# Patient Record
Sex: FEMALE | Race: WHITE | NOT HISPANIC OR LATINO | Employment: UNEMPLOYED | RURAL
[De-identification: names, ages, dates, MRNs, and addresses within clinical notes are randomized per-mention and may not be internally consistent; named-entity substitution may affect disease eponyms.]

---

## 2021-08-18 ENCOUNTER — HOSPITAL ENCOUNTER (EMERGENCY)
Facility: HOSPITAL | Age: 26
Discharge: HOME | End: 2021-08-18
Payer: OTHER GOVERNMENT

## 2021-08-18 VITALS
BODY MASS INDEX: 37.36 KG/M2 | OXYGEN SATURATION: 100 % | RESPIRATION RATE: 16 BRPM | TEMPERATURE: 98 F | WEIGHT: 203 LBS | HEIGHT: 62 IN | SYSTOLIC BLOOD PRESSURE: 146 MMHG | HEART RATE: 62 BPM | DIASTOLIC BLOOD PRESSURE: 94 MMHG

## 2021-08-18 DIAGNOSIS — B34.9 VIRAL SYNDROME: Primary | ICD-10-CM

## 2021-08-18 PROCEDURE — 99282 PR EMERGENCY DEPT VISIT,LEVEL II: ICD-10-PCS | Mod: ,,, | Performed by: NURSE PRACTITIONER

## 2021-08-18 PROCEDURE — 99281 EMR DPT VST MAYX REQ PHY/QHP: CPT

## 2021-08-18 PROCEDURE — 99282 EMERGENCY DEPT VISIT SF MDM: CPT | Mod: ,,, | Performed by: NURSE PRACTITIONER

## 2022-01-30 ENCOUNTER — OFFICE VISIT (OUTPATIENT)
Dept: FAMILY MEDICINE | Facility: CLINIC | Age: 27
End: 2022-01-30
Payer: OTHER GOVERNMENT

## 2022-01-30 VITALS
SYSTOLIC BLOOD PRESSURE: 120 MMHG | TEMPERATURE: 98 F | WEIGHT: 200 LBS | HEART RATE: 106 BPM | OXYGEN SATURATION: 98 % | DIASTOLIC BLOOD PRESSURE: 81 MMHG | BODY MASS INDEX: 36.8 KG/M2 | HEIGHT: 62 IN

## 2022-01-30 DIAGNOSIS — Z11.52 ENCOUNTER FOR SCREENING FOR COVID-19: ICD-10-CM

## 2022-01-30 DIAGNOSIS — U07.1 CLINICAL DIAGNOSIS OF COVID-19: Primary | ICD-10-CM

## 2022-01-30 LAB
CTP QC/QA: YES
SARS-COV-2 AG RESP QL IA.RAPID: POSITIVE

## 2022-01-30 PROCEDURE — 87426 SARS CORONAVIRUS 2 ANTIGEN POCT: ICD-10-PCS | Mod: QW,,, | Performed by: NURSE PRACTITIONER

## 2022-01-30 PROCEDURE — 99213 OFFICE O/P EST LOW 20 MIN: CPT | Mod: ,,, | Performed by: NURSE PRACTITIONER

## 2022-01-30 PROCEDURE — 87426 SARSCOV CORONAVIRUS AG IA: CPT | Mod: QW,,, | Performed by: NURSE PRACTITIONER

## 2022-01-30 PROCEDURE — 99213 PR OFFICE/OUTPT VISIT, EST, LEVL III, 20-29 MIN: ICD-10-PCS | Mod: ,,, | Performed by: NURSE PRACTITIONER

## 2022-01-30 RX ORDER — LORATADINE 10 MG/1
10 TABLET ORAL DAILY
Qty: 30 TABLET | Refills: 0 | Status: SHIPPED | OUTPATIENT
Start: 2022-01-30 | End: 2023-10-02

## 2022-01-30 NOTE — PROGRESS NOTES
GORDON Farnsworth   [place] ROZINA RODRIGUEZ [address] [address]  [phone]      PATIENT NAME: Piper Antoine  : 1995  DATE: 22  MRN: [id number]      Billing Provider: GORDON Farnsworth  Level of Service: MT OFFICE/OUTPT VISIT, EST, LEVL III, 20-29 MIN  Patient PCP Information     Provider PCP Type    Primary Doctor No General          Reason for Visit / Chief Complaint: Fever, Cough (X2 days/), Nasal Congestion, Headache, and Sore Throat (X1 week)       Update PCP  Update Chief Complaint         History of Present Illness / Problem Focused Workflow     Patient presents to clinic with the above listed complaints. She is COVID positive today. States her children has COVID and Flu. She is currently taking an unknown antibiotic for a vaginal abscess. States she has not taken any over the counter medications for symptoms. Encouraged cough and deep breathing to prevent pneumonia.     Review of Systems     Review of Systems   Constitutional: Positive for fever. Negative for chills, diaphoresis and fatigue.   HENT: Positive for congestion and sore throat. Negative for ear pain, facial swelling and trouble swallowing.    Eyes: Negative for pain, discharge, redness, itching and visual disturbance.   Respiratory: Positive for cough. Negative for apnea, chest tightness, shortness of breath and wheezing.    Cardiovascular: Negative for chest pain, palpitations and leg swelling.   Gastrointestinal: Negative for abdominal pain, constipation, diarrhea, nausea and vomiting.   Genitourinary: Negative for dysuria.   Skin: Negative for rash.   Neurological: Positive for headaches. Negative for dizziness.       Medical / Social / Family History   History reviewed. No pertinent past medical history.    History reviewed. No pertinent surgical history.    Social History  Ms.  reports that she has never smoked. She has never used  smokeless tobacco. She reports that she does not drink alcohol and does not use drugs.    Family History  Ms.'s family history is not on file.    Medications and Allergies     Medications  No outpatient medications have been marked as taking for the 1/30/22 encounter (Office Visit) with GORDON Farnsworth.       Allergies  Review of patient's allergies indicates:  No Known Allergies    Physical Examination     Vitals:    01/30/22 1059   BP: 120/81   Pulse: 106   Temp: 98.2 °F (36.8 °C)     Physical Exam  Constitutional:       General: She is not in acute distress.     Appearance: She is ill-appearing.   HENT:      Right Ear: External ear normal.      Left Ear: External ear normal.      Nose: Rhinorrhea present. No congestion.   Eyes:      Extraocular Movements: Extraocular movements intact.      Conjunctiva/sclera: Conjunctivae normal.      Pupils: Pupils are equal, round, and reactive to light.   Cardiovascular:      Rate and Rhythm: Normal rate and regular rhythm.      Pulses: Normal pulses.      Heart sounds: Normal heart sounds.   Pulmonary:      Effort: Pulmonary effort is normal. No respiratory distress.      Breath sounds: Normal breath sounds. No wheezing, rhonchi or rales.   Chest:      Chest wall: Tenderness present.   Neurological:      Mental Status: She is alert.         Assessment and Plan (including Health Maintenance)      Problem List  Smart Sets  Document Outside HM   :    Health Maintenance Due   Topic Date Due    Hepatitis C Screening  Never done    Lipid Panel  Never done    COVID-19 Vaccine (1) Never done    HPV Vaccines (1 - 2-dose series) Never done    HIV Screening  Never done    TETANUS VACCINE  Never done    Pap Smear  Never done    Influenza Vaccine (1) Never done       Problem List Items Addressed This Visit    None     Visit Diagnoses     Encounter for screening for COVID-19    -  Primary    Relevant Orders    SARS Coronavirus 2 Antigen, POCT (Completed)    Clinical diagnosis  of COVID-19              The patient has no Health Maintenance topics of status Not Due    No future appointments.       Signature:  GORDON Farnsworth [address] [place] [address]  [phone]    Date of encounter: 1/30/22

## 2022-01-30 NOTE — LETTER
[address] [place] [address]  Phone: [phone]  Fax: [phone]          Return to Work/School    Patient: Piper Antoine  YOB: 1995   Date: 01/30/2022     To Whom It May Concern:     Piper Antoine was in contact with/seen in my office on 01/30/2022.     Piper Antoine has met the criteria for COVID-19 testing and has a POSITIVE result. She can return to work once they are asymptomatic for 24 hours without the use of fever reducing medications AND at least five days from the start of symptoms (or from the first positive result if they have no symptoms). She may return to work on 02/04/2022.     If you have any questions or concerns, or if I can be of further assistance, please do not hesitate to contact me.     Sincerely,    GORDON Farnsworth

## 2022-03-03 ENCOUNTER — HOSPITAL ENCOUNTER (EMERGENCY)
Facility: HOSPITAL | Age: 27
Discharge: HOME | End: 2022-03-03
Payer: OTHER GOVERNMENT

## 2022-03-03 VITALS
RESPIRATION RATE: 16 BRPM | HEART RATE: 70 BPM | WEIGHT: 211 LBS | OXYGEN SATURATION: 100 % | TEMPERATURE: 98 F | DIASTOLIC BLOOD PRESSURE: 68 MMHG | BODY MASS INDEX: 38.83 KG/M2 | SYSTOLIC BLOOD PRESSURE: 132 MMHG | HEIGHT: 62 IN

## 2022-03-03 DIAGNOSIS — M54.50 LOW BACK PAIN, UNSPECIFIED BACK PAIN LATERALITY, UNSPECIFIED CHRONICITY, UNSPECIFIED WHETHER SCIATICA PRESENT: Primary | ICD-10-CM

## 2022-03-03 LAB
B-HCG UR QL: NEGATIVE
BACTERIA #/AREA URNS HPF: ABNORMAL /HPF
BILIRUB UR QL STRIP: ABNORMAL
CLARITY UR: ABNORMAL
COLOR UR: ABNORMAL
CTP QC/QA: YES
GLUCOSE UR STRIP-MCNC: NEGATIVE MG/DL
KETONES UR STRIP-SCNC: NEGATIVE MG/DL
LEUKOCYTE ESTERASE UR QL STRIP: ABNORMAL
MUCOUS THREADS #/AREA URNS HPF: ABNORMAL /HPF
NITRITE UR QL STRIP: NEGATIVE
PH UR STRIP: 7.5 PH UNITS
PROT UR QL STRIP: 30
RBC # UR STRIP: ABNORMAL /UL
RBC #/AREA URNS HPF: ABNORMAL /HPF
SP GR UR STRIP: 1.01
SQUAMOUS #/AREA URNS LPF: ABNORMAL /LPF
UROBILINOGEN UR STRIP-ACNC: 0.2 MG/DL
WBC #/AREA URNS HPF: ABNORMAL /HPF

## 2022-03-03 PROCEDURE — 81025 URINE PREGNANCY TEST: CPT | Performed by: NURSE PRACTITIONER

## 2022-03-03 PROCEDURE — 99283 EMERGENCY DEPT VISIT LOW MDM: CPT | Mod: ,,, | Performed by: NURSE PRACTITIONER

## 2022-03-03 PROCEDURE — 99283 PR EMERGENCY DEPT VISIT,LEVEL III: ICD-10-PCS | Mod: ,,, | Performed by: NURSE PRACTITIONER

## 2022-03-03 PROCEDURE — 81001 URINALYSIS AUTO W/SCOPE: CPT | Performed by: NURSE PRACTITIONER

## 2022-03-03 PROCEDURE — 99284 EMERGENCY DEPT VISIT MOD MDM: CPT | Mod: 25

## 2022-03-03 RX ORDER — METHOCARBAMOL 500 MG/1
1000 TABLET, FILM COATED ORAL 3 TIMES DAILY
Qty: 30 TABLET | Refills: 0 | Status: SHIPPED | OUTPATIENT
Start: 2022-03-03 | End: 2022-03-08

## 2022-03-03 RX ORDER — IBUPROFEN 800 MG/1
800 TABLET ORAL EVERY 6 HOURS PRN
Qty: 20 TABLET | Refills: 0 | Status: SHIPPED | OUTPATIENT
Start: 2022-03-03 | End: 2022-08-23

## 2022-03-03 RX ORDER — KETOROLAC TROMETHAMINE 30 MG/ML
60 INJECTION, SOLUTION INTRAMUSCULAR; INTRAVENOUS
Status: DISCONTINUED | OUTPATIENT
Start: 2022-03-03 | End: 2022-03-03

## 2022-03-03 NOTE — ED PROVIDER NOTES
Encounter Date: 3/3/2022       History     Chief Complaint   Patient presents with    Flank Pain     Right     26 year old female presents to the emergency department to be evaluated for right flank pain that began two days ago. Her pain increases when she bends or twists. She has had some mild nausea. Denies any vomiting, abdominal pain, constipation, diarrhea, dysuria, vaginal discharge, fever, chills.    The history is provided by the patient.   Flank Pain  This is a new problem. The current episode started 2 days ago. Pertinent negatives include no chest pain, no abdominal pain, no headaches and no shortness of breath.     Review of patient's allergies indicates:  No Known Allergies  History reviewed. No pertinent past medical history.  History reviewed. No pertinent surgical history.  History reviewed. No pertinent family history.  Social History     Tobacco Use    Smoking status: Never Smoker    Smokeless tobacco: Never Used   Substance Use Topics    Alcohol use: Never    Drug use: Never     Review of Systems   Respiratory: Negative for shortness of breath.    Cardiovascular: Negative for chest pain.   Gastrointestinal: Positive for nausea. Negative for abdominal distention, abdominal pain, constipation, diarrhea and vomiting.   Genitourinary: Positive for flank pain.   Neurological: Negative for headaches.   All other systems reviewed and are negative.      Physical Exam     Initial Vitals [03/03/22 0915]   BP Pulse Resp Temp SpO2   128/65 73 18 98.4 °F (36.9 °C) 100 %      MAP       --         Physical Exam    Vitals reviewed.  Constitutional: She appears well-developed and well-nourished.   Neck: Neck supple.   Cardiovascular: Normal rate and regular rhythm.   Pulmonary/Chest: Breath sounds normal.   Abdominal: Abdomen is soft. Bowel sounds are normal. She exhibits no distension and no mass. There is no abdominal tenderness. There is no rebound and no guarding.   Musculoskeletal:         General:  Normal range of motion.      Cervical back: Normal and neck supple.      Thoracic back: Normal.      Lumbar back: Normal.     Neurological: She is alert and oriented to person, place, and time. She has normal strength. GCS score is 15. GCS eye subscore is 4. GCS verbal subscore is 5. GCS motor subscore is 6.   Skin: Skin is warm and dry. Capillary refill takes less than 2 seconds.   Psychiatric: She has a normal mood and affect.         Medical Screening Exam   See Full Note    ED Course   Procedures  Labs Reviewed   URINALYSIS - Abnormal; Notable for the following components:       Result Value    Color, UA Red (*)     Clarity, UA Cloudy (*)     Leukocytes, UA Small (*)     Protein, UA 30  (*)     Bilirubin, UA Small (*)     Blood, UA Large (*)     All other components within normal limits   URINALYSIS, MICROSCOPIC - Abnormal; Notable for the following components:    RBC, UA Too Numerous To Count (*)     Bacteria, UA Few (*)     Squamous Epithelial Cells, UA Few (*)     Mucus, UA Rare (*)     All other components within normal limits   POCT URINE PREGNANCY          Imaging Results          CT Renal Stone Study Abd Pelvis WO (Final result)  Result time 03/03/22 10:27:03    Final result by David Diamond DO (03/03/22 10:27:03)                 Impression:      1. No evidence to suggest acute pathology within the abdomen or pelvis.      Electronically signed by: David Diamond  Date:    03/03/2022  Time:    10:27             Narrative:    EXAMINATION:  CT RENAL STONE STUDY ABD PELVIS WO    CLINICAL HISTORY:  right flank pain;    COMPARISON:  None.    TECHNIQUE:  CT RENAL STONE STUDY ABD PELVIS WO    FINDINGS:  Lower lobes: Clear.    Cardiac: No effusion.    Abdomen:    Hepatobiliary/gallbladder: Normal for noncontrast technique.  Gallbladder is normal.  No ductal obstruction.    Spleen: Normal for noncontrast technique.    Pancreas: Normal for noncontrast technique.    Adrenal/Genitourinary system: Normal for  noncontrast technique.    Bowel and Mesentery: There is no evidence for bowel obstruction.  Normal appendix.    Peritoneum: Normal.    Retroperitoneum: No enlarged lymph nodes.    Vasculature: Normal.    Reproductive: Normal.    Lymph nodes: No enlarged lymph nodes.    Abdominal wall: Normal.    Osseous structures: Normal.                                 Medications - No data to display                    Clinical Impression:   Final diagnoses:  [M54.50] Low back pain, unspecified back pain laterality, unspecified chronicity, unspecified whether sciatica present (Primary)          ED Disposition Condition    Discharge Stable        ED Prescriptions     Medication Sig Dispense Start Date End Date Auth. Provider    ibuprofen (ADVIL,MOTRIN) 800 MG tablet Take 1 tablet (800 mg total) by mouth every 6 (six) hours as needed for Pain. 20 tablet 3/3/2022  GORDON Bhatti    methocarbamoL (ROBAXIN) 500 MG Tab Take 2 tablets (1,000 mg total) by mouth 3 (three) times daily. for 5 days 30 tablet 3/3/2022 3/8/2022 GORDON Bhatti        Follow-up Information    None          GORDON Bhatti  03/03/22 1058       GORDON Bhatti  03/03/22 1059       GORDON Bhatti  03/03/22 1100

## 2022-06-28 ENCOUNTER — HOSPITAL ENCOUNTER (EMERGENCY)
Facility: HOSPITAL | Age: 27
Discharge: HOME | End: 2022-06-28
Payer: OTHER GOVERNMENT

## 2022-06-28 VITALS
SYSTOLIC BLOOD PRESSURE: 115 MMHG | RESPIRATION RATE: 17 BRPM | HEIGHT: 62 IN | TEMPERATURE: 99 F | OXYGEN SATURATION: 97 % | HEART RATE: 87 BPM | DIASTOLIC BLOOD PRESSURE: 77 MMHG | WEIGHT: 211 LBS | BODY MASS INDEX: 38.83 KG/M2
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
## 2022-06-28 DIAGNOSIS — S60.562A INSECT BITE OF LEFT HAND, INITIAL ENCOUNTER: Primary | ICD-10-CM

## 2022-06-28 PROCEDURE — 99282 EMERGENCY DEPT VISIT SF MDM: CPT

## 2022-06-28 PROCEDURE — 99283 EMERGENCY DEPT VISIT LOW MDM: CPT | Mod: ,,, | Performed by: NURSE PRACTITIONER

## 2022-06-28 PROCEDURE — 99283 PR EMERGENCY DEPT VISIT,LEVEL III: ICD-10-PCS | Mod: ,,, | Performed by: NURSE PRACTITIONER

## 2022-06-28 RX ORDER — TRIAMCINOLONE ACETONIDE 0.25 MG/G
CREAM TOPICAL 2 TIMES DAILY
Qty: 15 G | Refills: 0 | Status: SHIPPED | OUTPATIENT
Start: 2022-06-28 | End: 2022-08-23

## 2022-06-28 NOTE — ED TRIAGE NOTES
Pt reports being bitten by a small black insect that looked like a spider on the outside of her left hand. Denies fever or swelling to the area

## 2022-06-29 NOTE — DISCHARGE INSTRUCTIONS
Use ointment as directed to affected area.   Follow up with PCP in 2 days if symptoms do not improve.  Return to ER with new or worsening symptoms.

## 2022-07-16 NOTE — ED PROVIDER NOTES
Encounter Date: 6/28/2022       History     Chief Complaint   Patient presents with    Insect Bite     Patient presents to ER with complaint of possible spider bite.  Patient states she notices a small black bug on her hand yesterday.  After she noticed a red spot on side of her hand.  No itching.  No drainage.  Area is not swollen.      The history is provided by the patient. No  was used.     Review of patient's allergies indicates:  No Known Allergies  History reviewed. No pertinent past medical history.  History reviewed. No pertinent surgical history.  History reviewed. No pertinent family history.  Social History     Tobacco Use    Smoking status: Never Smoker    Smokeless tobacco: Never Used   Substance Use Topics    Alcohol use: Never    Drug use: Never     Review of Systems   Skin: Positive for wound (red bump on left hand).   All other systems reviewed and are negative.      Physical Exam     Initial Vitals [06/28/22 1817]   BP Pulse Resp Temp SpO2   115/77 87 17 99.2 °F (37.3 °C) 97 %      MAP       --         Physical Exam    Nursing note and vitals reviewed.  Constitutional: She appears well-developed and well-nourished.   HENT:   Head: Normocephalic.   Right Ear: External ear normal.   Left Ear: External ear normal.   Nose: Nose normal.   Mouth/Throat: Oropharynx is clear and moist.   Eyes: Conjunctivae and EOM are normal. Pupils are equal, round, and reactive to light.   Neck: Neck supple.   Normal range of motion.  Cardiovascular: Normal rate, regular rhythm, normal heart sounds and intact distal pulses.   Pulmonary/Chest: Breath sounds normal.   Abdominal: Abdomen is soft. Bowel sounds are normal.   Musculoskeletal:         General: Normal range of motion.      Cervical back: Normal range of motion and neck supple.     Neurological: She is alert and oriented to person, place, and time. She has normal strength. GCS score is 15. GCS eye subscore is 4. GCS verbal subscore is 5.  GCS motor subscore is 6.   Skin: Skin is warm and dry. Capillary refill takes less than 2 seconds.        Psychiatric: She has a normal mood and affect. Her behavior is normal. Judgment and thought content normal.         Medical Screening Exam   See Full Note    ED Course   Procedures  Labs Reviewed - No data to display       Imaging Results    None          Medications - No data to display                    Clinical Impression:   Final diagnoses:  [S60.562A, W57.XXXA] Insect bite of left hand, initial encounter (Primary)          ED Disposition Condition    Discharge Stable        ED Prescriptions     Medication Sig Dispense Start Date End Date Auth. Provider    triamcinolone acetonide 0.025% (KENALOG) 0.025 % cream Apply topically 2 (two) times daily. 15 g 6/28/2022  GORDON Franklin        Follow-up Information     Follow up With Specialties Details Why Contact Info    Primary Care Provider  Schedule an appointment as soon as possible for a visit in 2 days If symptoms worsen            GORDON Franklin  07/16/22 9451

## 2022-08-23 ENCOUNTER — OFFICE VISIT (OUTPATIENT)
Dept: FAMILY MEDICINE | Facility: CLINIC | Age: 27
End: 2022-08-23
Payer: OTHER GOVERNMENT

## 2022-08-23 VITALS
RESPIRATION RATE: 18 BRPM | HEART RATE: 68 BPM | TEMPERATURE: 98 F | WEIGHT: 211 LBS | HEIGHT: 62 IN | DIASTOLIC BLOOD PRESSURE: 80 MMHG | BODY MASS INDEX: 38.83 KG/M2 | OXYGEN SATURATION: 99 % | SYSTOLIC BLOOD PRESSURE: 132 MMHG

## 2022-08-23 DIAGNOSIS — J06.9 UPPER RESPIRATORY TRACT INFECTION, UNSPECIFIED TYPE: Primary | ICD-10-CM

## 2022-08-23 DIAGNOSIS — J02.9 PHARYNGITIS, UNSPECIFIED ETIOLOGY: ICD-10-CM

## 2022-08-23 DIAGNOSIS — R51.9 HEAD ACHE: ICD-10-CM

## 2022-08-23 DIAGNOSIS — Z11.52 ENCOUNTER FOR SCREENING LABORATORY TESTING FOR COVID-19 VIRUS: ICD-10-CM

## 2022-08-23 DIAGNOSIS — R05.9 COUGH: ICD-10-CM

## 2022-08-23 PROBLEM — E66.9 OBESITY, UNSPECIFIED: Status: ACTIVE | Noted: 2022-08-23

## 2022-08-23 PROBLEM — L20.84 INTRINSIC (ALLERGIC) ECZEMA: Status: ACTIVE | Noted: 2022-08-23

## 2022-08-23 PROBLEM — F41.9 ANXIETY DISORDER, UNSPECIFIED: Status: ACTIVE | Noted: 2022-08-23

## 2022-08-23 LAB
CTP QC/QA: YES
SARS-COV-2 AG RESP QL IA.RAPID: NEGATIVE

## 2022-08-23 PROCEDURE — 99213 OFFICE O/P EST LOW 20 MIN: CPT | Mod: ,,, | Performed by: NURSE PRACTITIONER

## 2022-08-23 PROCEDURE — 87426 SARSCOV CORONAVIRUS AG IA: CPT | Mod: QW,,, | Performed by: NURSE PRACTITIONER

## 2022-08-23 PROCEDURE — 87635 SARS-COV-2 COVID-19 AMP PRB: CPT | Mod: ,,, | Performed by: CLINICAL MEDICAL LABORATORY

## 2022-08-23 PROCEDURE — 99213 PR OFFICE/OUTPT VISIT, EST, LEVL III, 20-29 MIN: ICD-10-PCS | Mod: ,,, | Performed by: NURSE PRACTITIONER

## 2022-08-23 PROCEDURE — 87635 SARS-COV-2 (COVID-19) QUALITATIVE PCR: ICD-10-PCS | Mod: ,,, | Performed by: CLINICAL MEDICAL LABORATORY

## 2022-08-23 PROCEDURE — 87426 SARS CORONAVIRUS 2 ANTIGEN POCT: ICD-10-PCS | Mod: QW,,, | Performed by: NURSE PRACTITIONER

## 2022-08-23 RX ORDER — PROMETHAZINE HYDROCHLORIDE AND DEXTROMETHORPHAN HYDROBROMIDE 6.25; 15 MG/5ML; MG/5ML
5 SYRUP ORAL EVERY 6 HOURS PRN
Qty: 150 ML | Refills: 0 | Status: SHIPPED | OUTPATIENT
Start: 2022-08-23 | End: 2022-09-02

## 2022-08-23 RX ORDER — METHYLPREDNISOLONE 4 MG/1
TABLET ORAL
Qty: 21 EACH | Refills: 0 | Status: SHIPPED | OUTPATIENT
Start: 2022-08-23 | End: 2022-09-13

## 2022-08-23 RX ORDER — AMOXICILLIN 875 MG/1
875 TABLET, FILM COATED ORAL EVERY 12 HOURS
Qty: 20 TABLET | Refills: 0 | Status: SHIPPED | OUTPATIENT
Start: 2022-08-23 | End: 2022-09-02

## 2022-08-23 NOTE — LETTER
August 23, 2022      [facility]  [address] [facility] [address]  Phone: [phone]  Fax: [phone]       Patient: Piper Antoine   YOB: 1995  Date of Visit: 08/23/2022    To Whom It May Concern:    Juan Antoine  was at [facility] on 08/23/2022. The patient may return to work/school on 08/25/2022 with no restrictions. If you have any questions or concerns, or if I can be of further assistance, please do not hesitate to contact me.    Sincerely,    Alice Chilel RN

## 2022-08-23 NOTE — PROGRESS NOTES
"Subjective:       Patient ID: Piper Antoine is a 27 y.o. female.    Chief Complaint: Sore Throat (Sore throat, coughing, and congestion )    Presents to clinic as above. No fever.     Review of Systems   Constitutional: Positive for malaise/fatigue.   HENT: Positive for congestion, sinus pain and sore throat. Negative for ear pain.    Respiratory: Positive for cough. Negative for shortness of breath and wheezing.    Cardiovascular: Negative.    Musculoskeletal: Negative.    Neurological: Positive for headaches.          Reviewed family, medical, surgical, and social history.    Objective:      /80   Pulse 68   Temp 98.4 °F (36.9 °C)   Resp 18   Ht 5' 2" (1.575 m)   Wt 95.7 kg (211 lb)   LMP 08/08/2022   SpO2 99%   BMI 38.59 kg/m²   Physical Exam  Vitals and nursing note reviewed.   Constitutional:       General: She is not in acute distress.     Appearance: Normal appearance. She is not ill-appearing, toxic-appearing or diaphoretic.   HENT:      Head: Normocephalic.      Right Ear: Hearing, tympanic membrane, ear canal and external ear normal.      Left Ear: Hearing, tympanic membrane, ear canal and external ear normal.      Nose: Mucosal edema, congestion and rhinorrhea present. Rhinorrhea is clear.      Right Turbinates: Enlarged and swollen.      Left Turbinates: Enlarged and swollen.      Right Sinus: No maxillary sinus tenderness or frontal sinus tenderness.      Left Sinus: No maxillary sinus tenderness or frontal sinus tenderness.      Mouth/Throat:      Lips: Pink.      Mouth: Mucous membranes are moist.      Pharynx: Uvula midline. Posterior oropharyngeal erythema present. No pharyngeal swelling, oropharyngeal exudate or uvula swelling.      Tonsils: No tonsillar exudate or tonsillar abscesses.   Cardiovascular:      Rate and Rhythm: Normal rate and regular rhythm.      Heart sounds: Normal heart sounds.   Pulmonary:      Effort: Pulmonary effort is normal.      Breath sounds: Normal breath " sounds.   Skin:     General: Skin is warm and dry.   Neurological:      Mental Status: She is alert.   Psychiatric:         Mood and Affect: Mood normal.         Behavior: Behavior normal.         Thought Content: Thought content normal.         Judgment: Judgment normal.            Office Visit on 08/23/2022   Component Date Value Ref Range Status    SARS Coronavirus 2 Antigen 08/23/2022 Negative  Negative Final     Acceptable 08/23/2022 Yes   Final      Assessment:       1. Upper respiratory tract infection, unspecified type    2. Encounter for screening laboratory testing for COVID-19 virus    3. Pharyngitis, unspecified etiology    4. Cough    5. Head ache        Plan:       Upper respiratory tract infection, unspecified type  -     amoxicillin (AMOXIL) 875 MG tablet; Take 1 tablet (875 mg total) by mouth every 12 (twelve) hours. for 10 days  Dispense: 20 tablet; Refill: 0  -     promethazine-dextromethorphan (PROMETHAZINE-DM) 6.25-15 mg/5 mL Syrp; Take 5 mLs by mouth every 6 (six) hours as needed (cough).  Dispense: 150 mL; Refill: 0  -     methylPREDNISolone (MEDROL DOSEPACK) 4 mg tablet; use as directed  Dispense: 21 each; Refill: 0    Encounter for screening laboratory testing for COVID-19 virus  -     SARS Coronavirus 2 Antigen, POCT    Pharyngitis, unspecified etiology  -     amoxicillin (AMOXIL) 875 MG tablet; Take 1 tablet (875 mg total) by mouth every 12 (twelve) hours. for 10 days  Dispense: 20 tablet; Refill: 0  -     methylPREDNISolone (MEDROL DOSEPACK) 4 mg tablet; use as directed  Dispense: 21 each; Refill: 0    Cough  -     COVID-19 Routine Screening; Future; Expected date: 08/23/2022    Head ache  -     COVID-19 Routine Screening; Future; Expected date: 08/23/2022    Copy of result given (neg rapid covid)  Covid PCR  I will call with results. Quarantine until PCR back  Retest with any new or persistent s/s  RTC PRN          Risks, benefits, and side effects were discussed with  the patient. All questions were answered to the fullest satisfaction of the patient, and pt verbalized understanding and agreement to treatment plan. Pt was to call with any new or worsening symptoms, or present to the ER.

## 2022-08-24 LAB — SARS-COV-2 RNA RESP QL NAA+PROBE: NEGATIVE

## 2022-12-18 ENCOUNTER — HOSPITAL ENCOUNTER (EMERGENCY)
Facility: HOSPITAL | Age: 27
Discharge: HOME | End: 2022-12-19
Attending: EMERGENCY MEDICINE
Payer: OTHER GOVERNMENT

## 2022-12-18 DIAGNOSIS — R11.0 NAUSEA: ICD-10-CM

## 2022-12-18 DIAGNOSIS — B37.31 VAGINAL YEAST INFECTION: Primary | ICD-10-CM

## 2022-12-18 PROCEDURE — 99284 PR EMERGENCY DEPT VISIT,LEVEL IV: ICD-10-PCS | Mod: ,,, | Performed by: EMERGENCY MEDICINE

## 2022-12-18 PROCEDURE — 99284 EMERGENCY DEPT VISIT MOD MDM: CPT | Mod: ,,, | Performed by: EMERGENCY MEDICINE

## 2022-12-19 VITALS
BODY MASS INDEX: 37.92 KG/M2 | OXYGEN SATURATION: 100 % | DIASTOLIC BLOOD PRESSURE: 64 MMHG | RESPIRATION RATE: 18 BRPM | HEIGHT: 63 IN | HEART RATE: 84 BPM | SYSTOLIC BLOOD PRESSURE: 102 MMHG | WEIGHT: 214 LBS | TEMPERATURE: 98 F

## 2022-12-19 LAB
B-HCG UR QL: NEGATIVE
BACTERIA #/AREA URNS HPF: ABNORMAL /HPF
BILIRUB UR QL STRIP: NEGATIVE
CLARITY UR: ABNORMAL
COLOR UR: YELLOW
CTP QC/QA: YES
GLUCOSE UR STRIP-MCNC: NORMAL MG/DL
KETONES UR STRIP-SCNC: NEGATIVE MG/DL
LEUKOCYTE ESTERASE UR QL STRIP: ABNORMAL
MUCOUS THREADS #/AREA URNS HPF: ABNORMAL /HPF
NITRITE UR QL STRIP: NEGATIVE
PH UR STRIP: 5.5 PH UNITS
PROT UR QL STRIP: 10
RBC # UR STRIP: ABNORMAL /UL
RBC #/AREA URNS HPF: ABNORMAL /HPF
SP GR UR STRIP: 1.03
SQUAMOUS #/AREA URNS LPF: ABNORMAL /LPF
TRICHOMONAS #/AREA URNS HPF: ABNORMAL /HPF
UROBILINOGEN UR STRIP-ACNC: NORMAL MG/DL
WBC #/AREA URNS HPF: ABNORMAL /HPF
YEAST #/AREA URNS HPF: ABNORMAL /HPF

## 2022-12-19 PROCEDURE — 81025 URINE PREGNANCY TEST: CPT | Performed by: EMERGENCY MEDICINE

## 2022-12-19 PROCEDURE — 25000003 PHARM REV CODE 250: Performed by: EMERGENCY MEDICINE

## 2022-12-19 PROCEDURE — 81001 URINALYSIS AUTO W/SCOPE: CPT | Performed by: EMERGENCY MEDICINE

## 2022-12-19 PROCEDURE — 99284 EMERGENCY DEPT VISIT MOD MDM: CPT

## 2022-12-19 RX ORDER — PROMETHAZINE HYDROCHLORIDE 25 MG/1
25 TABLET ORAL EVERY 6 HOURS PRN
Qty: 16 TABLET | Refills: 0 | Status: SHIPPED | OUTPATIENT
Start: 2022-12-19 | End: 2023-10-02

## 2022-12-19 RX ORDER — ACETAMINOPHEN 500 MG
1000 TABLET ORAL
Status: COMPLETED | OUTPATIENT
Start: 2022-12-19 | End: 2022-12-19

## 2022-12-19 RX ORDER — FLUCONAZOLE 200 MG/1
200 TABLET ORAL DAILY
Qty: 7 TABLET | Refills: 0 | Status: SHIPPED | OUTPATIENT
Start: 2022-12-19 | End: 2022-12-26

## 2022-12-19 RX ADMIN — ACETAMINOPHEN 1000 MG: 500 TABLET ORAL at 01:12

## 2022-12-19 NOTE — ED NOTES
Asked about abnormal UA result. MD contacted. Results explain thoroughly to pt. Verbal understanding obtained. Discharge medications and FU care explained.

## 2022-12-19 NOTE — DISCHARGE INSTRUCTIONS
Take medication as prescribed.  Return to emergency department for any worsening or further problems.  Follow up in clinic with your primary care provider or OBGYN doctor if symptoms persist.

## 2022-12-19 NOTE — ED PROVIDER NOTES
Encounter Date: 12/18/2022       History     Chief Complaint   Patient presents with    Nausea     Patient is a 27-year-old female who started taking amoxicillin 4 days ago for possible strep throat.  2 days after that patient developed some vaginal itching and some dysuria.  Patient also complains of some nausea.  No fever, no vomiting, no diarrhea, no vaginal discharge or bleeding, no other acute problems or complaints at this time.    Review of patient's allergies indicates:  No Known Allergies  History reviewed. No pertinent past medical history.  History reviewed. No pertinent surgical history.  History reviewed. No pertinent family history.  Social History     Tobacco Use    Smoking status: Never    Smokeless tobacco: Never   Substance Use Topics    Alcohol use: Never    Drug use: Never     Review of Systems   Genitourinary:  Positive for dysuria.        Vaginal itching   All other systems reviewed and are negative.    Physical Exam     Initial Vitals [12/18/22 2344]   BP Pulse Resp Temp SpO2   102/64 84 18 98.4 °F (36.9 °C) 100 %      MAP       --         Physical Exam    Nursing note and vitals reviewed.  Constitutional: She appears well-developed and well-nourished.   HENT:   Head: Normocephalic.   Eyes: Pupils are equal, round, and reactive to light.   Neck: Neck supple.   Cardiovascular:  Normal rate.           Pulmonary/Chest: Breath sounds normal.   Abdominal: Abdomen is soft.   Musculoskeletal:         General: Normal range of motion.      Cervical back: Neck supple.     Neurological: She is alert.   Skin: Skin is warm. Capillary refill takes less than 2 seconds.   Psychiatric: She has a normal mood and affect.       Medical Screening Exam   See Full Note    ED Course   Procedures  Labs Reviewed   URINALYSIS, REFLEX TO URINE CULTURE - Abnormal; Notable for the following components:       Result Value    Leukocytes, UA Trace (*)     Protein, UA 10 (*)     Blood, UA Large (*)     Specific Gravity, UA  1.033 (*)     All other components within normal limits   URINALYSIS, MICROSCOPIC - Abnormal; Notable for the following components:    RBC, UA 15-25 (*)     Bacteria, UA Few (*)     Squamous Epithelial Cells, UA Few (*)     Mucus, UA Few (*)     All other components within normal limits   POCT URINE PREGNANCY          Imaging Results    None          Medications - No data to display              ED Course as of 12/19/22 0106   Mon Dec 19, 2022   0036 Preg Test, Ur: Negative [BB]   0047 Appearance, UA: Slightly Cloudy [BB]   0047 Leukocytes, UA(!): Trace [BB]      ED Course User Index  [BB] Tommy Jordan MD          Clinical Impression:   Final diagnoses:  [B37.31] Vaginal yeast infection (Primary)  [R11.0] Nausea        ED Disposition Condition    Discharge Stable          ED Prescriptions       Medication Sig Dispense Start Date End Date Auth. Provider    fluconazole (DIFLUCAN) 200 MG Tab Take 1 tablet (200 mg total) by mouth once daily. for 7 days 7 tablet 12/19/2022 12/26/2022 Tommy Jordan MD    promethazine (PHENERGAN) 25 MG tablet Take 1 tablet (25 mg total) by mouth every 6 (six) hours as needed for Nausea. 16 tablet 12/19/2022 -- Tommy Jordan MD          Follow-up Information    None          Tommy Jordan MD  12/19/22 0106

## 2022-12-19 NOTE — ED TRIAGE NOTES
Pt cc of burning upon urination, nausea, and dark stools. She handed me a hand written list of all of the things that is wrong with her. She called the nurses hotline and they referred her here.

## 2023-03-12 ENCOUNTER — HOSPITAL ENCOUNTER (EMERGENCY)
Facility: HOSPITAL | Age: 28
Discharge: HOME | End: 2023-03-12
Payer: OTHER GOVERNMENT

## 2023-03-12 VITALS
RESPIRATION RATE: 18 BRPM | SYSTOLIC BLOOD PRESSURE: 116 MMHG | OXYGEN SATURATION: 100 % | TEMPERATURE: 100 F | HEIGHT: 62 IN | HEART RATE: 122 BPM | BODY MASS INDEX: 38.83 KG/M2 | DIASTOLIC BLOOD PRESSURE: 68 MMHG | WEIGHT: 211 LBS

## 2023-03-12 DIAGNOSIS — R50.9 FEVER, UNSPECIFIED FEVER CAUSE: ICD-10-CM

## 2023-03-12 DIAGNOSIS — J02.0 STREP PHARYNGITIS: Primary | ICD-10-CM

## 2023-03-12 LAB
FLUAV AG UPPER RESP QL IA.RAPID: NEGATIVE
FLUBV AG UPPER RESP QL IA.RAPID: NEGATIVE
RAPID GROUP A STREP: POSITIVE
SARS-COV+SARS-COV-2 AG RESP QL IA.RAPID: NEGATIVE

## 2023-03-12 PROCEDURE — 99284 EMERGENCY DEPT VISIT MOD MDM: CPT | Mod: ,,, | Performed by: NURSE PRACTITIONER

## 2023-03-12 PROCEDURE — 25000003 PHARM REV CODE 250: Performed by: EMERGENCY MEDICINE

## 2023-03-12 PROCEDURE — 87880 STREP A ASSAY W/OPTIC: CPT | Performed by: NURSE PRACTITIONER

## 2023-03-12 PROCEDURE — 99284 PR EMERGENCY DEPT VISIT,LEVEL IV: ICD-10-PCS | Mod: ,,, | Performed by: NURSE PRACTITIONER

## 2023-03-12 PROCEDURE — 87428 SARSCOV & INF VIR A&B AG IA: CPT | Performed by: NURSE PRACTITIONER

## 2023-03-12 PROCEDURE — 99283 EMERGENCY DEPT VISIT LOW MDM: CPT

## 2023-03-12 RX ORDER — AMOXICILLIN 500 MG/1
500 CAPSULE ORAL EVERY 12 HOURS
Qty: 14 CAPSULE | Refills: 0 | Status: SHIPPED | OUTPATIENT
Start: 2023-03-12 | End: 2023-03-19

## 2023-03-12 RX ORDER — ACETAMINOPHEN 500 MG
1000 TABLET ORAL
Status: COMPLETED | OUTPATIENT
Start: 2023-03-12 | End: 2023-03-12

## 2023-03-12 RX ADMIN — ACETAMINOPHEN 1000 MG: 500 TABLET ORAL at 10:03

## 2023-03-12 NOTE — ED TRIAGE NOTES
Presents to ED for complaints of sore throat, fever and cough since yesterday.  Patient has not taken anything for fever.

## 2023-03-12 NOTE — DISCHARGE INSTRUCTIONS
Take antibiotic as directed  Tylenol or motrin as directed as needed for fever greater than 100.5 or for achy feeling  Drink plenty of fluids, avoid crunchy foods

## 2023-03-12 NOTE — ED PROVIDER NOTES
Encounter Date: 3/12/2023       History     Chief Complaint   Patient presents with    Sore Throat    Fever     28 y/o female presents to the ED for c/o fever, congestion, cough, and sore throat.  Reports sore throat and fever began last PM.  She has had allergy symptoms x4 days.  Denies n/v, has not taken anything for fever this morning.  Reports has been using Allegra D, but no decongestants.  Her son has recently been sick as well.    Review of patient's allergies indicates:  No Known Allergies  History reviewed. No pertinent past medical history.  History reviewed. No pertinent surgical history.  History reviewed. No pertinent family history.  Social History     Tobacco Use    Smoking status: Never    Smokeless tobacco: Never   Substance Use Topics    Alcohol use: Never    Drug use: Never     Review of Systems   Constitutional:  Positive for fever.   HENT:  Positive for congestion, postnasal drip and sore throat.    Respiratory:  Negative for shortness of breath.    Cardiovascular:  Negative for chest pain.   Gastrointestinal:  Negative for nausea.   Genitourinary:  Negative for dysuria.   Musculoskeletal:  Negative for back pain.   Skin:  Negative for rash.   Neurological:  Negative for weakness.   Hematological:  Does not bruise/bleed easily.   All other systems reviewed and are negative.    Physical Exam     Initial Vitals [03/12/23 1009]   BP Pulse Resp Temp SpO2   117/84 (!) 141 20 (!) 101.2 °F (38.4 °C) 97 %      MAP       --         Physical Exam    Nursing note and vitals reviewed.  Constitutional: She appears well-developed and well-nourished.   HENT:   Head: Normocephalic.   Nose: Rhinorrhea present.   Mouth/Throat: Mucous membranes are normal. Oropharyngeal exudate and posterior oropharyngeal erythema present.   Eyes: EOM are normal. Pupils are equal, round, and reactive to light.   Neck: Neck supple.   Cardiovascular:  Regular rhythm, normal heart sounds and intact distal pulses.   Tachycardia  present.         Pulmonary/Chest: Breath sounds normal. She has no wheezes.   Abdominal: Abdomen is soft. Bowel sounds are normal. She exhibits no mass. There is no abdominal tenderness (generalized). There is no rebound and no guarding.   Musculoskeletal:         General: No tenderness. Normal range of motion.      Cervical back: Neck supple.     Neurological: She is alert and oriented to person, place, and time. She has normal strength and normal reflexes. GCS score is 15. GCS eye subscore is 4. GCS verbal subscore is 5. GCS motor subscore is 6.   Skin: Skin is warm and dry.   Psychiatric: She has a normal mood and affect.       Medical Screening Exam   See Full Note    ED Course   Procedures  Labs Reviewed   THROAT SCREEN, RAPID STREP - Abnormal; Notable for the following components:       Result Value    Rapid Group A Strep Positive (*)     All other components within normal limits   SARS-COV2 (COVID) W/ FLU ANTIGEN - Normal    Narrative:     Negative SARS-CoV results should not be used as the sole basis for treatment or patient management decisions; negative results should be considered in the context of a patient's recent exposures, history and the presene of clinical signs and symptoms consistent with COVID-19.  Negative results should be treated as presumptive and confirmed by molecular assay, if necessary for patient management.          Imaging Results    None          Medications   acetaminophen tablet 1,000 mg (1,000 mg Oral Given 3/12/23 1021)     Medical Decision Making:   Initial Assessment:   28 y/o female presents to the ED for c/o fever, congestion, cough, and sore throat.  Reports sore throat and fever began last PM.  She has had allergy symptoms x4 days.  Denies n/v, has not taken anything for fever this morning.  Reports has been using Allegra D, but no decongestants.  Her son has recently been sick as well.    Differential Diagnosis:   Viral URI  Allergic rhinitis  Covid 19  Strep  throat  Fever  flu  ED Management:  Swab for flu/covid 19 and strep  MDM    Patient presents for emergent evaluation of acute strep throat that poses a threat to life and/or bodily function.    In the ED patient found to have acute strep throat.    I ordered labs and personally reviewed them.  Labs significant for strep A    Discharge MDM  Patient was managed in the ED with tylenol  The response to treatment was good.    Patient was discharged in stable condition.  Detailed return precautions discussed.                   Clinical Impression:   Final diagnoses:  [J02.0] Strep pharyngitis (Primary)  [R50.9] Fever, unspecified fever cause        ED Disposition Condition    Discharge Stable          ED Prescriptions       Medication Sig Dispense Start Date End Date Auth. Provider    amoxicillin (AMOXIL) 500 MG capsule Take 1 capsule (500 mg total) by mouth every 12 (twelve) hours. for 7 days 14 capsule 3/12/2023 3/19/2023 GORDON Simpson          Follow-up Information    None          GORDON Simpson  03/12/23 1121

## 2023-10-02 ENCOUNTER — OFFICE VISIT (OUTPATIENT)
Dept: FAMILY MEDICINE | Facility: CLINIC | Age: 28
End: 2023-10-02
Payer: OTHER GOVERNMENT

## 2023-10-02 VITALS
RESPIRATION RATE: 18 BRPM | WEIGHT: 219.63 LBS | HEART RATE: 66 BPM | BODY MASS INDEX: 40.42 KG/M2 | TEMPERATURE: 98 F | SYSTOLIC BLOOD PRESSURE: 112 MMHG | HEIGHT: 62 IN | OXYGEN SATURATION: 98 % | DIASTOLIC BLOOD PRESSURE: 87 MMHG

## 2023-10-02 DIAGNOSIS — Z11.59 ENCOUNTER FOR SCREENING FOR VIRAL DISEASE: ICD-10-CM

## 2023-10-02 DIAGNOSIS — J06.9 VIRAL URI: Primary | ICD-10-CM

## 2023-10-02 PROBLEM — H10.9 CONJUNCTIVITIS: Status: ACTIVE | Noted: 2023-07-25

## 2023-10-02 PROBLEM — E55.9 VITAMIN D DEFICIENCY: Status: ACTIVE | Noted: 2023-05-11

## 2023-10-02 PROBLEM — R30.0 DYSURIA: Status: ACTIVE | Noted: 2023-10-02

## 2023-10-02 PROBLEM — J02.9 PHARYNGITIS: Status: ACTIVE | Noted: 2023-10-02

## 2023-10-02 PROBLEM — E66.01 MORBID OBESITY: Status: ACTIVE | Noted: 2022-08-23

## 2023-10-02 PROBLEM — M79.673 FOOT PAIN: Status: ACTIVE | Noted: 2023-08-10

## 2023-10-02 LAB
CTP QC/QA: YES
FLUAV AG NPH QL: NEGATIVE
FLUBV AG NPH QL: NEGATIVE
SARS-COV-2 AG RESP QL IA.RAPID: NEGATIVE

## 2023-10-02 PROCEDURE — 99212 OFFICE O/P EST SF 10 MIN: CPT | Mod: ,,, | Performed by: NURSE PRACTITIONER

## 2023-10-02 PROCEDURE — 99212 PR OFFICE/OUTPT VISIT, EST, LEVL II, 10-19 MIN: ICD-10-PCS | Mod: ,,, | Performed by: NURSE PRACTITIONER

## 2023-10-02 PROCEDURE — 87428 SARSCOV & INF VIR A&B AG IA: CPT | Mod: QW,,, | Performed by: NURSE PRACTITIONER

## 2023-10-02 PROCEDURE — 87428 POCT SARS-COV2 (COVID) WITH FLU ANTIGEN: ICD-10-PCS | Mod: QW,,, | Performed by: NURSE PRACTITIONER

## 2023-10-02 RX ORDER — MELOXICAM 15 MG/1
15 TABLET ORAL
COMMUNITY
Start: 2023-09-13 | End: 2023-12-05 | Stop reason: CLARIF

## 2023-10-02 NOTE — PROGRESS NOTES
"Subjective:       Patient ID: Piper Antoine is a 28 y.o. female.    Chief Complaint: Nasal Congestion (Onset Thursday. )    Presents to clinic as above. Also has loss of taste. Her child has covid s/s with fever.       Review of Systems   Constitutional: Negative.    HENT:  Positive for congestion. Negative for ear pain, sinus pain and sore throat.    Respiratory: Negative.     Cardiovascular: Negative.    Musculoskeletal: Negative.    Neurological: Negative.           Reviewed family, medical, surgical, and social history.    Objective:      /87 (BP Location: Left arm, Patient Position: Sitting, BP Method: Large (Automatic))   Pulse 66   Temp 98 °F (36.7 °C) (Oral)   Resp 18   Ht 5' 2" (1.575 m)   Wt 99.6 kg (219 lb 9.6 oz)   LMP 09/29/2023   SpO2 98%   BMI 40.17 kg/m²   Physical Exam  Vitals and nursing note reviewed.   Constitutional:       General: She is not in acute distress.     Appearance: Normal appearance. She is normal weight. She is not ill-appearing, toxic-appearing or diaphoretic.   HENT:      Head: Normocephalic.      Right Ear: Hearing, tympanic membrane, ear canal and external ear normal.      Left Ear: Hearing, tympanic membrane, ear canal and external ear normal.      Nose: Mucosal edema, congestion and rhinorrhea present. Rhinorrhea is clear.      Right Turbinates: Enlarged and swollen.      Left Turbinates: Enlarged and swollen.      Right Sinus: No maxillary sinus tenderness or frontal sinus tenderness.      Left Sinus: No maxillary sinus tenderness or frontal sinus tenderness.      Mouth/Throat:      Lips: Pink.      Mouth: Mucous membranes are moist.      Pharynx: Uvula midline. No pharyngeal swelling, oropharyngeal exudate, posterior oropharyngeal erythema or uvula swelling.      Tonsils: No tonsillar exudate or tonsillar abscesses.   Cardiovascular:      Rate and Rhythm: Normal rate and regular rhythm.      Heart sounds: Normal heart sounds.   Pulmonary:      Effort: " Pulmonary effort is normal.      Breath sounds: Normal breath sounds.   Musculoskeletal:      Cervical back: Normal range of motion and neck supple. No rigidity or tenderness.   Lymphadenopathy:      Cervical: No cervical adenopathy.   Skin:     General: Skin is warm and dry.   Neurological:      Mental Status: She is alert.   Psychiatric:         Mood and Affect: Mood normal.         Behavior: Behavior normal.         Thought Content: Thought content normal.         Judgment: Judgment normal.            Office Visit on 10/02/2023   Component Date Value Ref Range Status    SARS Coronavirus 2 Antigen 10/02/2023 Negative  Negative Final    Rapid Influenza A Ag 10/02/2023 Negative  Negative Final    Rapid Influenza B Ag 10/02/2023 Negative  Negative Final     Acceptable 10/02/2023 Yes   Final      Assessment:       1. Viral URI    2. Encounter for screening for viral disease        Plan:       Viral URI    Encounter for screening for viral disease  -     POCT SARS-COV2 (COVID) with Flu Antigen    Treat s/s with OTC meds  RTC PRN          Risks, benefits, and side effects were discussed with the patient. All questions were answered to the fullest satisfaction of the patient, and pt verbalized understanding and agreement to treatment plan. Pt was to call with any new or worsening symptoms, or present to the ER.

## 2023-12-05 ENCOUNTER — HOSPITAL ENCOUNTER (EMERGENCY)
Facility: HOSPITAL | Age: 28
Discharge: HOME | End: 2023-12-05
Payer: OTHER GOVERNMENT

## 2023-12-05 VITALS
BODY MASS INDEX: 39.75 KG/M2 | SYSTOLIC BLOOD PRESSURE: 138 MMHG | HEART RATE: 112 BPM | WEIGHT: 216 LBS | TEMPERATURE: 100 F | RESPIRATION RATE: 20 BRPM | HEIGHT: 62 IN | DIASTOLIC BLOOD PRESSURE: 90 MMHG | OXYGEN SATURATION: 98 %

## 2023-12-05 DIAGNOSIS — J02.9 ACUTE PHARYNGITIS, UNSPECIFIED ETIOLOGY: ICD-10-CM

## 2023-12-05 DIAGNOSIS — J01.10 ACUTE NON-RECURRENT FRONTAL SINUSITIS: Primary | ICD-10-CM

## 2023-12-05 PROCEDURE — 63600175 PHARM REV CODE 636 W HCPCS: Performed by: NURSE PRACTITIONER

## 2023-12-05 PROCEDURE — 99284 EMERGENCY DEPT VISIT MOD MDM: CPT | Mod: ,,, | Performed by: NURSE PRACTITIONER

## 2023-12-05 PROCEDURE — 99284 EMERGENCY DEPT VISIT MOD MDM: CPT

## 2023-12-05 PROCEDURE — 96372 THER/PROPH/DIAG INJ SC/IM: CPT | Performed by: NURSE PRACTITIONER

## 2023-12-05 PROCEDURE — 99284 PR EMERGENCY DEPT VISIT,LEVEL IV: ICD-10-PCS | Mod: ,,, | Performed by: NURSE PRACTITIONER

## 2023-12-05 RX ORDER — BENZONATATE 100 MG/1
100 CAPSULE ORAL 3 TIMES DAILY PRN
Qty: 20 CAPSULE | Refills: 0 | Status: SHIPPED | OUTPATIENT
Start: 2023-12-05 | End: 2023-12-15

## 2023-12-05 RX ORDER — METHYLPREDNISOLONE 4 MG/1
TABLET ORAL
Qty: 1 EACH | Refills: 0 | Status: SHIPPED | OUTPATIENT
Start: 2023-12-05 | End: 2023-12-26

## 2023-12-05 RX ORDER — AMOXICILLIN AND CLAVULANATE POTASSIUM 875; 125 MG/1; MG/1
1 TABLET, FILM COATED ORAL 2 TIMES DAILY
Qty: 14 TABLET | Refills: 0 | Status: SHIPPED | OUTPATIENT
Start: 2023-12-05

## 2023-12-05 RX ORDER — DEXAMETHASONE SODIUM PHOSPHATE 4 MG/ML
4 INJECTION, SOLUTION INTRA-ARTICULAR; INTRALESIONAL; INTRAMUSCULAR; INTRAVENOUS; SOFT TISSUE
Status: COMPLETED | OUTPATIENT
Start: 2023-12-05 | End: 2023-12-05

## 2023-12-05 RX ADMIN — DEXAMETHASONE SODIUM PHOSPHATE 4 MG: 4 INJECTION, SOLUTION INTRA-ARTICULAR; INTRALESIONAL; INTRAMUSCULAR; INTRAVENOUS; SOFT TISSUE at 07:12

## 2023-12-05 NOTE — Clinical Note
"Piper Pantoja"Elina was seen and treated in our emergency department on 12/5/2023.  She may return to work on 12/08/2023.       If you have any questions or concerns, please don't hesitate to call.      Elly Koch, FNP"

## 2023-12-06 NOTE — ED PROVIDER NOTES
Encounter Date: 12/5/2023       History     Chief Complaint   Patient presents with    Sore Throat    Cough    Chills    Numbness     Patient states right index finger numbness intermittently.      Patient presents ER with complaint of congestion, sore throat chills and body aches and numbness to her right ring finger.  Patient reports her symptoms started this afternoon.  She denies fever.  Reports a nonproductive cough.  States she will be very cold 1 minute and then starts sweating.  She denies nausea vomiting or diarrhea.  Reports decreased appetite.    The history is provided by the patient. No  was used.     Review of patient's allergies indicates:  No Known Allergies  History reviewed. No pertinent past medical history.  History reviewed. No pertinent surgical history.  History reviewed. No pertinent family history.  Social History     Tobacco Use    Smoking status: Never     Passive exposure: Current    Smokeless tobacco: Never    Tobacco comments:        Substance Use Topics    Alcohol use: Never    Drug use: Never     Review of Systems   Constitutional:  Positive for activity change, appetite change and fatigue.   All other systems reviewed and are negative.      Physical Exam     Initial Vitals [12/05/23 1856]   BP Pulse Resp Temp SpO2   (!) 138/90 (!) 112 20 99.6 °F (37.6 °C) 98 %      MAP       --         Physical Exam    Nursing note and vitals reviewed.  Constitutional: Vital signs are normal. She appears well-developed and well-nourished. She is cooperative. She has a sickly appearance.   HENT:   Head: Normocephalic.   Right Ear: Hearing, tympanic membrane, external ear and ear canal normal.   Left Ear: Hearing, tympanic membrane, external ear and ear canal normal.   Nose: Right sinus exhibits maxillary sinus tenderness and frontal sinus tenderness. Left sinus exhibits maxillary sinus tenderness and frontal sinus tenderness.   Mouth/Throat: Oropharyngeal exudate and  posterior oropharyngeal erythema present.   Eyes: EOM are normal.   Neck:   Normal range of motion.  Cardiovascular:  Normal rate, normal heart sounds and intact distal pulses.           Pulmonary/Chest: Breath sounds normal.   Abdominal: Abdomen is soft. Bowel sounds are normal.   Musculoskeletal:         General: Normal range of motion.      Cervical back: Normal range of motion.     Neurological: She is alert and oriented to person, place, and time. She has normal strength. GCS score is 15. GCS eye subscore is 4. GCS verbal subscore is 5. GCS motor subscore is 6.   Skin: Skin is warm and dry. Capillary refill takes less than 2 seconds.   Psychiatric: She has a normal mood and affect. Her behavior is normal. Judgment and thought content normal.         Medical Screening Exam   See Full Note    ED Course   Procedures  Labs Reviewed - No data to display       Imaging Results    None          Medications   dexAMETHasone injection 4 mg (4 mg Intramuscular Given 12/5/23 1909)     Medical Decision Making  Patient presents ER with complaint of congestion, sore throat chills and body aches and numbness to her right ring finger.  Patient reports her symptoms started this afternoon.  She denies fever.  Reports a nonproductive cough.  States she will be very cold 1 minute and then starts sweating.  She denies nausea vomiting or diarrhea.  Reports decreased appetite.      Amount and/or Complexity of Data Reviewed  Independent Historian: spouse  Discussion of management or test interpretation with external provider(s): Decadron 4 mg IM    Patient was discharged home with acute nonrecurrent frontal sinusitis and acute pharyngitis.  She was given prescription for Augmentin Medrol Dosepak and Tessalon Perles.  She was instructed to take medications as prescribed follow up with primary care provider in 2 days if symptoms do not improve with treatment.    Risk  Prescription drug management.                                       Clinical Impression:   Final diagnoses:  [J01.10] Acute non-recurrent frontal sinusitis (Primary)  [J02.9] Acute pharyngitis, unspecified etiology        ED Disposition Condition    Discharge Stable          ED Prescriptions       Medication Sig Dispense Start Date End Date Auth. Provider    amoxicillin-clavulanate 875-125mg (AUGMENTIN) 875-125 mg per tablet Take 1 tablet by mouth 2 (two) times daily. 14 tablet 12/5/2023 -- Elly Koch FNP    methylPREDNISolone (MEDROL DOSEPACK) 4 mg tablet Take medications as prescribed. 1 each 12/5/2023 12/26/2023 Elly Koch FNP    benzonatate (TESSALON) 100 MG capsule Take 1 capsule (100 mg total) by mouth 3 (three) times daily as needed for Cough. 20 capsule 12/5/2023 12/15/2023 Elly Koch FNP          Follow-up Information       Follow up With Specialties Details Why Contact Info    Kusum Roper MD Psychiatry Schedule an appointment as soon as possible for a visit in 2 days If symptoms worsen [address]  [address]  [place] [address]               Elly Koch FNP  12/05/23 6905

## 2023-12-06 NOTE — DISCHARGE INSTRUCTIONS
Take medications as prescribed.   Follow up with PCP in 2 days for recheck if symptoms do not improve.   Return to ER with new or worsening symptoms.

## 2024-06-28 ENCOUNTER — OFFICE VISIT (OUTPATIENT)
Dept: FAMILY MEDICINE | Facility: CLINIC | Age: 29
End: 2024-06-28
Payer: COMMERCIAL

## 2024-06-28 VITALS
SYSTOLIC BLOOD PRESSURE: 115 MMHG | HEIGHT: 62 IN | HEART RATE: 59 BPM | DIASTOLIC BLOOD PRESSURE: 70 MMHG | RESPIRATION RATE: 18 BRPM | OXYGEN SATURATION: 100 % | BODY MASS INDEX: 40.85 KG/M2 | WEIGHT: 222 LBS | TEMPERATURE: 98 F

## 2024-06-28 DIAGNOSIS — H61.891 IRRITATION OF EXTERNAL EAR CANAL, RIGHT: Primary | ICD-10-CM

## 2024-06-28 PROBLEM — Z98.890 OTHER SPECIFIED POSTPROCEDURAL STATES: Status: ACTIVE | Noted: 2024-03-19

## 2024-06-28 PROBLEM — Z00.00 ENCOUNTER FOR GENERAL ADULT MEDICAL EXAMINATION WITHOUT ABNORMAL FINDINGS: Status: ACTIVE | Noted: 2024-03-19

## 2024-06-28 PROBLEM — U07.1 DISEASE DUE TO SEVERE ACUTE RESPIRATORY SYNDROME CORONAVIRUS 2 (SARS-COV-2): Status: ACTIVE | Noted: 2024-02-15

## 2024-06-28 PROBLEM — E66.3 OVERWEIGHT: Status: ACTIVE | Noted: 2024-03-19

## 2024-06-28 PROBLEM — Z12.4: Status: ACTIVE | Noted: 2024-03-19

## 2024-06-28 RX ORDER — MULTIVITAMIN
TABLET ORAL
COMMUNITY
Start: 2024-03-19

## 2024-06-28 RX ORDER — CIPROFLOXACIN HYDROCHLORIDE 3 MG/ML
SOLUTION/ DROPS OPHTHALMIC
Qty: 5 ML | Refills: 0 | Status: SHIPPED | OUTPATIENT
Start: 2024-06-28 | End: 2024-06-28

## 2024-06-28 RX ORDER — CIPROFLOXACIN HYDROCHLORIDE 3 MG/ML
SOLUTION/ DROPS OPHTHALMIC
Qty: 5 ML | Refills: 0 | Status: SHIPPED | OUTPATIENT
Start: 2024-06-28

## 2024-09-30 PROBLEM — Z00.00 ENCOUNTER FOR GENERAL ADULT MEDICAL EXAMINATION WITHOUT ABNORMAL FINDINGS: Status: RESOLVED | Noted: 2024-03-19 | Resolved: 2024-09-30

## 2024-12-28 ENCOUNTER — OFFICE VISIT (OUTPATIENT)
Dept: FAMILY MEDICINE | Facility: CLINIC | Age: 29
End: 2024-12-28
Payer: COMMERCIAL

## 2024-12-28 VITALS
TEMPERATURE: 98 F | HEIGHT: 62 IN | HEART RATE: 79 BPM | WEIGHT: 222 LBS | BODY MASS INDEX: 40.85 KG/M2 | OXYGEN SATURATION: 98 % | RESPIRATION RATE: 18 BRPM | SYSTOLIC BLOOD PRESSURE: 128 MMHG | DIASTOLIC BLOOD PRESSURE: 83 MMHG

## 2024-12-28 DIAGNOSIS — N89.8 VAGINAL DISCHARGE: ICD-10-CM

## 2024-12-28 DIAGNOSIS — N76.0 ACUTE VAGINITIS: Primary | ICD-10-CM

## 2024-12-28 LAB
BILIRUB SERPL-MCNC: NEGATIVE MG/DL
BLOOD URINE, POC: NEGATIVE
CANDIDA SPECIES: POSITIVE
CLARITY, UA: ABNORMAL
COLOR, UA: ABNORMAL
GARDNERELLA: NEGATIVE
GLUCOSE UR QL STRIP: NEGATIVE
KETONES UR QL STRIP: NEGATIVE
LEUKOCYTE ESTERASE URINE, POC: ABNORMAL
NITRITE, POC UA: NEGATIVE
PH, POC UA: 6.5
PROTEIN, POC: NEGATIVE
SPECIFIC GRAVITY, POC UA: 1.02
TRICHOMONAS: NEGATIVE
UROBILINOGEN, POC UA: 1

## 2024-12-28 PROCEDURE — 3074F SYST BP LT 130 MM HG: CPT | Mod: ,,, | Performed by: NURSE PRACTITIONER

## 2024-12-28 PROCEDURE — 3008F BODY MASS INDEX DOCD: CPT | Mod: ,,, | Performed by: NURSE PRACTITIONER

## 2024-12-28 PROCEDURE — 87480 CANDIDA DNA DIR PROBE: CPT | Mod: ,,, | Performed by: CLINICAL MEDICAL LABORATORY

## 2024-12-28 PROCEDURE — 99214 OFFICE O/P EST MOD 30 MIN: CPT | Mod: ,,, | Performed by: NURSE PRACTITIONER

## 2024-12-28 PROCEDURE — 1159F MED LIST DOCD IN RCRD: CPT | Mod: ,,, | Performed by: NURSE PRACTITIONER

## 2024-12-28 PROCEDURE — 87510 GARDNER VAG DNA DIR PROBE: CPT | Mod: ,,, | Performed by: CLINICAL MEDICAL LABORATORY

## 2024-12-28 PROCEDURE — 87086 URINE CULTURE/COLONY COUNT: CPT | Mod: ,,, | Performed by: CLINICAL MEDICAL LABORATORY

## 2024-12-28 PROCEDURE — 87077 CULTURE AEROBIC IDENTIFY: CPT | Mod: ,,, | Performed by: CLINICAL MEDICAL LABORATORY

## 2024-12-28 PROCEDURE — 87660 TRICHOMONAS VAGIN DIR PROBE: CPT | Mod: ,,, | Performed by: CLINICAL MEDICAL LABORATORY

## 2024-12-28 PROCEDURE — 99051 MED SERV EVE/WKEND/HOLIDAY: CPT | Mod: ,,, | Performed by: NURSE PRACTITIONER

## 2024-12-28 PROCEDURE — 3079F DIAST BP 80-89 MM HG: CPT | Mod: ,,, | Performed by: NURSE PRACTITIONER

## 2024-12-28 RX ORDER — FLUCONAZOLE 150 MG/1
150 TABLET ORAL
Qty: 2 TABLET | Refills: 0 | Status: SHIPPED | OUTPATIENT
Start: 2024-12-28 | End: 2025-01-05

## 2024-12-28 RX ORDER — FLUCONAZOLE 150 MG/1
150 TABLET ORAL
Qty: 2 TABLET | Refills: 0 | Status: SHIPPED | OUTPATIENT
Start: 2024-12-28 | End: 2024-12-28

## 2024-12-28 RX ORDER — METRONIDAZOLE 500 MG/1
500 TABLET ORAL EVERY 12 HOURS
Qty: 14 TABLET | Refills: 0 | Status: SHIPPED | OUTPATIENT
Start: 2024-12-28 | End: 2025-01-04

## 2024-12-28 RX ORDER — METRONIDAZOLE 500 MG/1
500 TABLET ORAL EVERY 12 HOURS
Qty: 14 TABLET | Refills: 0 | Status: SHIPPED | OUTPATIENT
Start: 2024-12-28 | End: 2024-12-28

## 2024-12-28 NOTE — PROGRESS NOTES
Subjective:       Patient ID: Piper Antoine is a 29 y.o. female.    Chief Complaint: Vaginitis (Patient thinks she has a yeast infection. Itchy, red, irritated ) and Urinary Tract Infection (Wants to be tested for uti )    Vaginitis redness, itching and irritation    Review of Systems   Constitutional:  Negative for chills, fatigue and fever.   Genitourinary:  Positive for vaginal discharge and vaginal pain. Negative for dysuria, flank pain, frequency, genital sores and urgency.       Objective:      Physical Exam  Constitutional:       General: She is not in acute distress.     Appearance: Normal appearance. She is not ill-appearing, toxic-appearing or diaphoretic.   Eyes:      Pupils: Pupils are equal, round, and reactive to light.   Cardiovascular:      Rate and Rhythm: Normal rate and regular rhythm.      Pulses: Normal pulses.      Heart sounds: Normal heart sounds.   Pulmonary:      Effort: Pulmonary effort is normal.      Breath sounds: Normal breath sounds.   Abdominal:      General: Bowel sounds are normal.      Palpations: Abdomen is soft.      Tenderness: There is no abdominal tenderness. There is no right CVA tenderness, left CVA tenderness, guarding or rebound.   Genitourinary:     Vagina: Vaginal discharge present.   Musculoskeletal:         General: Normal range of motion.   Skin:     General: Skin is warm and dry.   Neurological:      Mental Status: She is alert and oriented to person, place, and time.   Psychiatric:         Mood and Affect: Mood normal.         Behavior: Behavior normal.          Assessment:       1. Acute vaginitis    2. Vaginal discharge        Plan:   Acute vaginitis  -     Bacterial Vaginosis  -     POCT URINALYSIS W/O SCOPE  -     Urine culture  -     Chlamydia/GC, PCR; Future; Expected date: 12/28/2024  -     metroNIDAZOLE (FLAGYL) 500 MG tablet; Take 1 tablet (500 mg total) by mouth every 12 (twelve) hours. for 14 doses  Dispense: 14 tablet; Refill: 0  -     fluconazole  (DIFLUCAN) 150 MG Tab; Take 1 tablet (150 mg total) by mouth every 7 days. for 2 doses  Dispense: 2 tablet; Refill: 0    Vaginal discharge           Risks, benefits, and side effects were discussed with the patient. All questions were answered to the fullest satisfaction of the patient, and pt verbalized understanding and agreement to treatment plan. Pt was to call with any new or worsening symptoms, or present to the ER

## 2024-12-30 LAB — UA COMPLETE W REFLEX CULTURE PNL UR: ABNORMAL

## 2025-03-20 ENCOUNTER — OFFICE VISIT (OUTPATIENT)
Dept: FAMILY MEDICINE | Facility: CLINIC | Age: 30
End: 2025-03-20
Payer: COMMERCIAL

## 2025-03-20 VITALS
DIASTOLIC BLOOD PRESSURE: 72 MMHG | HEART RATE: 74 BPM | TEMPERATURE: 98 F | SYSTOLIC BLOOD PRESSURE: 105 MMHG | OXYGEN SATURATION: 98 %

## 2025-03-20 DIAGNOSIS — B34.9 VIRAL ILLNESS: Primary | ICD-10-CM

## 2025-03-20 DIAGNOSIS — J02.9 SORE THROAT: ICD-10-CM

## 2025-03-20 DIAGNOSIS — R50.9 FEVER, UNSPECIFIED FEVER CAUSE: ICD-10-CM

## 2025-03-20 LAB
CTP QC/QA: YES
MOLECULAR STREP A: NEGATIVE
POC MOLECULAR INFLUENZA A AGN: NEGATIVE
POC MOLECULAR INFLUENZA B AGN: NEGATIVE
SARS-COV-2 RDRP RESP QL NAA+PROBE: NEGATIVE

## 2025-03-20 PROCEDURE — 87651 STREP A DNA AMP PROBE: CPT | Mod: QW,,, | Performed by: NURSE PRACTITIONER

## 2025-03-20 PROCEDURE — 87502 INFLUENZA DNA AMP PROBE: CPT | Mod: QW,,, | Performed by: NURSE PRACTITIONER

## 2025-03-20 RX ORDER — CHLORPHENIRAMINE MALEATE AND PHENYLEPHRINE HYDROCHLORIDE 4; 10 MG/1; MG/1
1 TABLET, COATED ORAL EVERY 6 HOURS PRN
Qty: 20 TABLET | Refills: 0 | Status: SHIPPED | OUTPATIENT
Start: 2025-03-20 | End: 2025-03-30

## 2025-03-20 NOTE — LETTER
March 20, 2025      [facility]  [address]  [place] [address]  Phone: [phone]  Fax: [phone]       Patient: Piper Antoine   YOB: 1995  Date of Visit: 03/20/2025    To Whom It May Concern:    Juan Antoine  was at [facility] on 03/20/2025. The patient may return to work/school on 03/23/2025 with no restrictions. If you have any questions or concerns, or if I can be of further assistance, please do not hesitate to contact me.    Sincerely,    GORDON Connell

## 2025-03-20 NOTE — PROGRESS NOTES
Subjective:       Patient ID: Piper Antoine is a 29 y.o. female.    Chief Complaint: Fever and Sore Throat (Symptoms x3 days. Denies any other symptoms.)    Fever and Sore Throat (Symptoms x3 days. Denies any other symptoms.)      Review of Systems   Constitutional:  Positive for fever. Negative for appetite change and fatigue.   HENT:  Positive for nasal congestion and sore throat. Negative for ear pain.    Eyes:  Negative for pain, discharge and itching.   Respiratory:  Negative for cough and shortness of breath.    Cardiovascular:  Negative for chest pain and leg swelling.   Gastrointestinal:  Negative for abdominal pain, change in bowel habit, nausea and vomiting.   Musculoskeletal:  Negative for back pain, gait problem and neck pain.   Integumentary:  Negative for rash and wound.   Allergic/Immunologic: Negative for immunocompromised state.   Neurological:  Negative for dizziness, weakness and headaches.   All other systems reviewed and are negative.        Objective:      Physical Exam  Vitals and nursing note reviewed.   Constitutional:       General: She is not in acute distress.     Appearance: Normal appearance. She is not ill-appearing, toxic-appearing or diaphoretic.   HENT:      Head: Normocephalic.      Right Ear: Tympanic membrane, ear canal and external ear normal.      Left Ear: Tympanic membrane, ear canal and external ear normal.      Nose: Congestion present. No rhinorrhea.      Mouth/Throat:      Mouth: Mucous membranes are moist.      Pharynx: Posterior oropharyngeal erythema (injected) present. No oropharyngeal exudate.   Eyes:      General: No scleral icterus.        Right eye: No discharge.         Left eye: No discharge.      Extraocular Movements: Extraocular movements intact.      Conjunctiva/sclera: Conjunctivae normal.      Pupils: Pupils are equal, round, and reactive to light.   Cardiovascular:      Rate and Rhythm: Normal rate and regular rhythm.      Pulses: Normal pulses.       Heart sounds: Normal heart sounds. No murmur heard.  Pulmonary:      Effort: Pulmonary effort is normal. No respiratory distress.      Breath sounds: Normal breath sounds. No wheezing, rhonchi or rales.   Musculoskeletal:         General: Normal range of motion.      Cervical back: Normal range of motion and neck supple. Tenderness present.   Lymphadenopathy:      Cervical: Cervical adenopathy present.   Skin:     General: Skin is warm and dry.      Capillary Refill: Capillary refill takes less than 2 seconds.      Findings: No rash.   Neurological:      Mental Status: She is alert and oriented to person, place, and time.   Psychiatric:         Mood and Affect: Mood normal.         Behavior: Behavior normal.         Thought Content: Thought content normal.         Judgment: Judgment normal.            Assessment:       1. Viral illness    2. Sore throat    3. Fever, unspecified fever cause        Plan:   Viral illness    Sore throat  -     POCT Strep A, Molecular  -     POCT Influenza A/B Molecular  -     POCT COVID-19 Rapid Screening    Fever, unspecified fever cause  -     POCT Influenza A/B Molecular  -     POCT COVID-19 Rapid Screening    Other orders  -     chlorpheniramine-phenylephrine (ED A-HIST) 4-10 mg per tablet; Take 1 tablet by mouth every 6 (six) hours as needed for Congestion.  Dispense: 20 tablet; Refill: 0       RTC in 2 days if not better    Risks, benefits, and side effects were discussed with the patient. All questions were answered to the fullest satisfaction of the patient, and pt verbalized understanding and agreement to treatment plan. Pt was to call with any new or worsening symptoms, or present to the ER

## 2025-06-11 ENCOUNTER — OFFICE VISIT (OUTPATIENT)
Dept: FAMILY MEDICINE | Facility: CLINIC | Age: 30
End: 2025-06-11
Payer: COMMERCIAL

## 2025-06-11 VITALS
HEART RATE: 61 BPM | SYSTOLIC BLOOD PRESSURE: 103 MMHG | OXYGEN SATURATION: 100 % | DIASTOLIC BLOOD PRESSURE: 72 MMHG | TEMPERATURE: 99 F

## 2025-06-11 DIAGNOSIS — N89.8 VAGINAL ODOR: ICD-10-CM

## 2025-06-11 DIAGNOSIS — L25.9 CONTACT DERMATITIS, UNSPECIFIED CONTACT DERMATITIS TYPE, UNSPECIFIED TRIGGER: Primary | ICD-10-CM

## 2025-06-11 LAB
BACTERIAL VAGINOSIS DNA (OHS): NEGATIVE
BILIRUB SERPL-MCNC: NEGATIVE MG/DL
BLOOD URINE, POC: NEGATIVE
CANDIDA GLABRATA/KRUSEI DNA (OHS): NOT DETECTED
CANDIDA SPECIES DNA (OHS): NOT DETECTED
CLARITY, UA: ABNORMAL
COLOR, UA: YELLOW
GLUCOSE UR QL STRIP: NEGATIVE
KETONES UR QL STRIP: NEGATIVE
LEUKOCYTE ESTERASE URINE, POC: ABNORMAL
NITRITE, POC UA: NEGATIVE
PH, POC UA: 5.5
PROTEIN, POC: NEGATIVE
SPECIFIC GRAVITY, POC UA: 1.02
TRICHOMONAS VAGINALIS DNA (OHS): NOT DETECTED
UROBILINOGEN, POC UA: 0.2

## 2025-06-11 PROCEDURE — 1159F MED LIST DOCD IN RCRD: CPT | Mod: ,,, | Performed by: NURSE PRACTITIONER

## 2025-06-11 PROCEDURE — 99213 OFFICE O/P EST LOW 20 MIN: CPT | Mod: ,,, | Performed by: NURSE PRACTITIONER

## 2025-06-11 PROCEDURE — 81515 NFCT DS BV&VAGINITIS DNA ALG: CPT | Mod: QW,,, | Performed by: CLINICAL MEDICAL LABORATORY

## 2025-06-11 PROCEDURE — 1160F RVW MEDS BY RX/DR IN RCRD: CPT | Mod: ,,, | Performed by: NURSE PRACTITIONER

## 2025-06-11 PROCEDURE — 3074F SYST BP LT 130 MM HG: CPT | Mod: ,,, | Performed by: NURSE PRACTITIONER

## 2025-06-11 PROCEDURE — 3078F DIAST BP <80 MM HG: CPT | Mod: ,,, | Performed by: NURSE PRACTITIONER

## 2025-06-11 RX ORDER — METHYLPREDNISOLONE 4 MG/1
TABLET ORAL
Qty: 21 EACH | Refills: 0 | Status: SHIPPED | OUTPATIENT
Start: 2025-06-11 | End: 2025-06-14

## 2025-06-11 RX ORDER — TRIAMCINOLONE ACETONIDE 1 MG/G
CREAM TOPICAL 2 TIMES DAILY
Qty: 80 G | Refills: 1 | Status: SHIPPED | OUTPATIENT
Start: 2025-06-11

## 2025-06-11 NOTE — PROGRESS NOTES
"[facility]  Chief Complaint      Chief Complaint   Patient presents with    Rash     C/o of rash that appeared 1 week ago. Location Right lateral neck, posterior trunk, and chest. S/S: itchy and "weird bumps".        History of Present Illness      Piper Antoine is a 30 y.o. female with no significant chronic conditions. She presents today for evaluation of a skin rash to right lateral neck, chest, and posterior trunk of body. Describes areas as red and itchy. Onset of symptoms 1 week ago. The pt associates the skin rash with a beach trip and sun burn. The pt also reports vaginal odor and feeling that her ph is off.    Rash  This is a new problem. The current episode started in the past 7 days. The problem is unchanged. The affected locations include the chest, neck, torso and back. The rash is characterized by redness and itchiness. It is unknown if there was an exposure to a precipitant. Pertinent negatives include no cough, diarrhea, fatigue, fever, shortness of breath or vomiting.      Past Medical History:  History reviewed. No pertinent past medical history.    Past Surgical History:   has no past surgical history on file.    Social History:  Social History[1]    I personally reviewed all past medical, surgical, and social.     Review of Systems   Constitutional:  Negative for appetite change, fatigue, fever and unexpected weight change.   Respiratory:  Negative for cough, shortness of breath and wheezing.    Cardiovascular:  Negative for chest pain and leg swelling.   Gastrointestinal:  Negative for abdominal pain, constipation, diarrhea, nausea and vomiting.   Genitourinary:  Negative for difficulty urinating, dysuria, flank pain, pelvic pain, vaginal discharge and vaginal pain.   Musculoskeletal:  Negative for arthralgias and myalgias.   Skin:  Positive for color change and rash.   Neurological:  Negative for dizziness, syncope, weakness and headaches.   Psychiatric/Behavioral:  " Negative for dysphoric mood.       Medications:  Encounter Medications[2]    Allergies:  Review of patient's allergies indicates:  No Known Allergies    Health Maintenance:    There is no immunization history on file for this patient.     Health Maintenance   Topic Date Due    Hepatitis C Screening  Never done    Cervical Cancer Screening  Never done    Lipid Panel  Never done    HIV Screening  Never done    COVID-19 Vaccine (1 - 2024-25 season) Never done    Influenza Vaccine (Season Ended) 09/01/2025    TETANUS VACCINE  10/25/2032    RSV Vaccine (Age 60+ and Pregnant patients) (1 - 1-dose 75+ series) 05/26/2070    Pneumococcal Vaccines (Age 0-49)  Aged Out        Physical Exam      Vital Signs  Temp: 98.8 °F (37.1 °C)  Temp Source: Oral  Pulse: 61  SpO2: 100 %  BP: 103/72  BP Location: Left arm  Patient Position: Sitting]    Physical Exam  Constitutional:       General: She is not in acute distress.     Appearance: Normal appearance.   HENT:      Head: Normocephalic.      Mouth/Throat:      Mouth: Mucous membranes are moist.   Cardiovascular:      Rate and Rhythm: Normal rate and regular rhythm.      Pulses: Normal pulses.      Heart sounds: Normal heart sounds. No murmur heard.  Pulmonary:      Effort: Pulmonary effort is normal. No respiratory distress.      Breath sounds: Normal breath sounds. No wheezing, rhonchi or rales.   Abdominal:      Palpations: Abdomen is soft.      Tenderness: There is no abdominal tenderness.   Musculoskeletal:         General: Normal range of motion.      Cervical back: Neck supple.   Skin:     General: Skin is warm and dry.      Findings: Erythema and rash present. Rash is urticarial.      Comments: Right lateral neck, chest wall, and posterior trunk   Neurological:      Mental Status: She is alert and oriented to person, place, and time.   Psychiatric:         Mood and Affect: Mood normal.         Behavior: Behavior normal.        Laboratory:  CBC:        LIPIDS:     "    TSH:        A1C:        No results found for: "GLU", "NA", "K", "CL", "CO2", "BUN", "CREATININE", "CALCIUM", "PROT", "ALBUMIN", "BILITOT", "ALKPHOS", "AST", "ALT", "ANIONGAP", "ESTGFRAFRICA", "EGFRNONAA"    No results found for: "WBC", "RBC", "HGB", "HCT", "MCV", "RDW", "PLT"     No results found for: "CHOL", "TRIG", "HDL", "LDLCALC", "TOTALCHOLEST"    No results found for: "TSH"    No results found for: "HGBA1C", "ESTIMATEDAVG"     No components found for: "VITAMIND"    No results found for: "PSA"    Point Of Care Testing:  WBC, UA   Date Value Ref Range Status   12/28/2024 Trace  Final     Nitrite, UA   Date Value Ref Range Status   12/28/2024 Negative  Final     Urobilinogen, UA   Date Value Ref Range Status   12/28/2024 1.0  Final     Protein, POC   Date Value Ref Range Status   12/28/2024 Negative  Final     pH, UA   Date Value Ref Range Status   12/28/2024 6.5  Final     Spec Grav UA   Date Value Ref Range Status   12/28/2024 1.025  Final     Ketones, UA   Date Value Ref Range Status   12/28/2024 Negative  Final     Bilirubin, POC   Date Value Ref Range Status   12/28/2024 Negative  Final     Glucose, UA   Date Value Ref Range Status   12/28/2024 Negative  Final       Lab Results   Component Value Date    [id number] Negative 10/02/2023    RAPFLUA Negative 10/02/2023    RAPFLUB Negative 10/02/2023         Assessment/Plan     1. Contact dermatitis, unspecified contact dermatitis type, unspecified trigger  -     methylPREDNISolone (MEDROL DOSEPACK) 4 mg tablet; use as directed  Dispense: 21 each; Refill: 0  -     triamcinolone acetonide 0.1% (KENALOG) 0.1 % cream; Apply topically 2 (two) times daily. Use sparingly BID for 14 days, then use as needed.  Dispense: 80 g; Refill: 1    2. Vaginal odor  -     Vaginosis Screen by DNA Probe; Future; Expected date: 06/11/2025  -     POCT URINALYSIS W/O SCOPE           Return to clinic as needed.    Questions answered to desired level of satisfaction    Verbalized " understanding to all information and instructions provided      DYLAN [facility]           [1]  Social History  Tobacco Use    Smoking status: Never     Passive exposure: Current    Smokeless tobacco: Never    Tobacco comments:        Substance Use Topics    Alcohol use: Never    Drug use: Never   [2]  Outpatient Encounter Medications as of 6/11/2025   Medication Sig Dispense Refill    methylPREDNISolone (MEDROL DOSEPACK) 4 mg tablet use as directed 21 each 0    multivitamin (THERAGRAN) per tablet Take by mouth.      triamcinolone acetonide 0.1% (KENALOG) 0.1 % cream Apply topically 2 (two) times daily. Use sparingly BID for 14 days, then use as needed. 80 g 1     No facility-administered encounter medications on file as of 6/11/2025.

## 2025-06-14 ENCOUNTER — OFFICE VISIT (OUTPATIENT)
Dept: FAMILY MEDICINE | Facility: CLINIC | Age: 30
End: 2025-06-14
Payer: COMMERCIAL

## 2025-06-14 VITALS
SYSTOLIC BLOOD PRESSURE: 122 MMHG | BODY MASS INDEX: 41.7 KG/M2 | TEMPERATURE: 98 F | HEART RATE: 78 BPM | WEIGHT: 228 LBS | DIASTOLIC BLOOD PRESSURE: 76 MMHG | OXYGEN SATURATION: 98 %

## 2025-06-14 DIAGNOSIS — R10.9 FLANK PAIN: ICD-10-CM

## 2025-06-14 DIAGNOSIS — R30.0 DYSURIA: Primary | ICD-10-CM

## 2025-06-14 LAB
BILIRUB SERPL-MCNC: NEGATIVE MG/DL
BLOOD URINE, POC: NEGATIVE
CLARITY, UA: CLEAR
COLOR, UA: YELLOW
GLUCOSE UR QL STRIP: NEGATIVE
KETONES UR QL STRIP: NEGATIVE
LEUKOCYTE ESTERASE URINE, POC: ABNORMAL
NITRITE, POC UA: NEGATIVE
PH, POC UA: 7
PROTEIN, POC: NEGATIVE
SPECIFIC GRAVITY, POC UA: 1.02
UROBILINOGEN, POC UA: 1

## 2025-06-14 PROCEDURE — 87077 CULTURE AEROBIC IDENTIFY: CPT | Mod: ,,, | Performed by: CLINICAL MEDICAL LABORATORY

## 2025-06-14 PROCEDURE — 1159F MED LIST DOCD IN RCRD: CPT | Mod: ,,,

## 2025-06-14 PROCEDURE — 99213 OFFICE O/P EST LOW 20 MIN: CPT | Mod: 25,,,

## 2025-06-14 PROCEDURE — 3078F DIAST BP <80 MM HG: CPT | Mod: ,,,

## 2025-06-14 PROCEDURE — 87086 URINE CULTURE/COLONY COUNT: CPT | Mod: ,,, | Performed by: CLINICAL MEDICAL LABORATORY

## 2025-06-14 PROCEDURE — 1160F RVW MEDS BY RX/DR IN RCRD: CPT | Mod: ,,,

## 2025-06-14 PROCEDURE — 96372 THER/PROPH/DIAG INJ SC/IM: CPT | Mod: ,,,

## 2025-06-14 PROCEDURE — 87186 SC STD MICRODIL/AGAR DIL: CPT | Mod: ,,, | Performed by: CLINICAL MEDICAL LABORATORY

## 2025-06-14 PROCEDURE — 3074F SYST BP LT 130 MM HG: CPT | Mod: ,,,

## 2025-06-14 PROCEDURE — 3008F BODY MASS INDEX DOCD: CPT | Mod: ,,,

## 2025-06-14 PROCEDURE — 99051 MED SERV EVE/WKEND/HOLIDAY: CPT | Mod: ,,,

## 2025-06-14 RX ORDER — NITROFURANTOIN 25; 75 MG/1; MG/1
100 CAPSULE ORAL 2 TIMES DAILY
Qty: 14 CAPSULE | Refills: 0 | Status: SHIPPED | OUTPATIENT
Start: 2025-06-14 | End: 2025-06-21

## 2025-06-14 RX ORDER — CEFTRIAXONE 1 G/1
1 INJECTION, POWDER, FOR SOLUTION INTRAMUSCULAR; INTRAVENOUS
Status: COMPLETED | OUTPATIENT
Start: 2025-06-14 | End: 2025-06-14

## 2025-06-14 RX ADMIN — CEFTRIAXONE 1 G: 1 INJECTION, POWDER, FOR SOLUTION INTRAMUSCULAR; INTRAVENOUS at 04:06

## 2025-06-14 NOTE — PROGRESS NOTES
KAMI MIRANDA, GORDON   [facility]  [address]  [place]  [address]        PATIENT NAME: Piper Antoine  : 1995  DATE: 25  MRN: [id number]        Reason for Visit / Chief Complaint: Flank Pain (R side for 3 days with dark urine)       Update PCP  Update Chief Complaint         History of Present Illness / Problem Focused Workflow     Piper Antoine presents to the clinic with Flank Pain (R side for 3 days with dark urine)     29 y/o female presents to clinic with complaints of right flank/lower back pain and dysuria that started about 3 days ago. She denies any fever, chills, abdominal pain, radiating pain, hematuria. Pt states heat to back seems to have helped some. Rates pain 3/10        Review of Systems     Review of Systems   Constitutional:  Negative for chills, fatigue and fever.   HENT:  Negative for nasal congestion, ear discharge, ear pain, rhinorrhea, sinus pressure/congestion and sore throat.    Respiratory:  Negative for cough, chest tightness, shortness of breath, wheezing and stridor.    Cardiovascular:  Negative for palpitations and claudication.   Gastrointestinal:  Negative for abdominal pain, constipation, diarrhea, nausea, vomiting and reflux.   Genitourinary:  Positive for dysuria and flank pain. Negative for frequency, hematuria and urgency.   Musculoskeletal:  Negative for myalgias.   Neurological:  Negative for dizziness, weakness, light-headedness and headaches.   Psychiatric/Behavioral:  Negative for suicidal ideas.         Medical / Social / Family History   History reviewed. No pertinent past medical history.    History reviewed. No pertinent surgical history.    Social History  Ms.  reports that she has never smoked. She has been exposed to tobacco smoke. She has never used smokeless tobacco. She reports that she does not drink alcohol and does not use drugs.    Family History  Ms.'s family history is not on file.    Medications and Allergies      Medications  Outpatient Medications Marked as Taking for the 6/14/25 encounter (Office Visit) with Derek Granda FNP   Medication Sig Dispense Refill    triamcinolone acetonide 0.1% (KENALOG) 0.1 % cream Apply topically 2 (two) times daily. Use sparingly BID for 14 days, then use as needed. 80 g 1     Current Facility-Administered Medications for the 6/14/25 encounter (Office Visit) with Derek Granda FNP   Medication Dose Route Frequency Provider Last Rate Last Admin    [COMPLETED] cefTRIAXone injection 1 g  1 g Intramuscular 1 time in Clinic/HOD Derek Granda FNP   1 g at 06/14/25 1602       Allergies  Review of patient's allergies indicates:  No Known Allergies    Physical Examination   /76 (BP Location: Left arm, Patient Position: Sitting)   Pulse 78   Temp 98.4 °F (36.9 °C) (Oral)   Wt 103.4 kg (228 lb)   LMP 05/24/2025 (Exact Date)   SpO2 98%   BMI 41.70 kg/m²    Physical Exam  Vitals and nursing note reviewed.   Constitutional:       General: She is awake.      Appearance: Normal appearance. She is obese.   HENT:      Head: Normocephalic.      Right Ear: Tympanic membrane, ear canal and external ear normal.      Left Ear: Tympanic membrane, ear canal and external ear normal.      Nose: Nose normal.      Mouth/Throat:      Lips: Pink.      Mouth: Mucous membranes are moist.      Pharynx: Oropharynx is clear. Uvula midline.   Cardiovascular:      Rate and Rhythm: Normal rate and regular rhythm.      Heart sounds: Normal heart sounds, S1 normal and S2 normal.   Pulmonary:      Effort: Pulmonary effort is normal. No respiratory distress.      Breath sounds: Normal breath sounds. No decreased breath sounds, wheezing, rhonchi or rales.   Abdominal:      General: Bowel sounds are normal.      Palpations: Abdomen is soft.      Tenderness: There is no abdominal tenderness.   Musculoskeletal:      Cervical back: Normal range of motion.   Skin:     General: Skin is warm.      Capillary Refill:  Capillary refill takes less than 2 seconds.   Neurological:      Mental Status: She is alert and oriented to person, place, and time.   Psychiatric:         Thought Content: Thought content does not include homicidal or suicidal ideation. Thought content does not include homicidal or suicidal plan.          Assessment and Plan (including Health Maintenance)      Problem List  Smart Sets  Document Outside            Health Maintenance Due   Topic Date Due    Hepatitis C Screening  Never done    Cervical Cancer Screening  Never done    Lipid Panel  Never done    HIV Screening  Never done    COVID-19 Vaccine (1 - 2024-25 season) Never done       Problem List Items Addressed This Visit       Dysuria - Primary    Current Assessment & Plan   UA obtained and urine culture sent. Will call pt with results and any new orders. Rocephin IM. Macrobid RX. Medication instructions given with understanding voiced. Increase fluids, cranberry juice, good hygiene.          Flank pain    Relevant Medications    cefTRIAXone injection 1 g (Completed)    nitrofurantoin, macrocrystal-monohydrate, (MACROBID) 100 MG capsule    Other Relevant Orders    POCT URINALYSIS W/O SCOPE (Completed)    Urine Culture High Risk ($$)       Health Maintenance Topics with due status: Not Due       Topic Last Completion Date    Influenza Vaccine 12/12/2020    TETANUS VACCINE 10/25/2022    RSV Vaccine (Age 60+ and Pregnant patients) Not Due       No future appointments.         Signature:      GORDON YEN   [place] [id number] [place]  [address]       Date of encounter: 6/14/25

## 2025-06-18 LAB — UA COMPLETE W REFLEX CULTURE PNL UR: ABNORMAL
